# Patient Record
(demographics unavailable — no encounter records)

---

## 2025-01-15 NOTE — ASSESSMENT
[FreeTextEntry1] : 1. average risk for colon cancer recommend colonoscopy for screening  Discussed risks including but not limited to bleeding,infection,drug reaction, perforation,missed lesion,benefits and alternatives of colonoscopy/egd with patient  including no treatment and patient consents to procedure.  plan colonoscopy to be scheduled  2. weight loss, frequent bms ,abdominal tenderness on exam recommend egd r/o pud, sprue etc.  plan egd to be scheduled.

## 2025-01-15 NOTE — REVIEW OF SYSTEMS
[As Noted in HPI] : as noted in HPI [Fever] : no fever [Chills] : no chills [Red Eyes] : eyes not red [Sore Throat] : no sore throat [Chest Pain] : no chest pain [SOB on Exertion] : no shortness of breath during exertion [Joint Stiffness] : no joint stiffness [Muscle Weakness] : no muscle weakness [Easy Bruising] : no tendency for easy bruising

## 2025-01-15 NOTE — HISTORY OF PRESENT ILLNESS
[FreeTextEntry1] : 66 yo here for weight loss.Patient reports 20lb weight loss over few months.Was seen in [facility] last month told had urine infection. Changed diet and lost weight l Patient reports 2bms, no brbpr, no melena.. no n/v. no gerd. no abdominal pain    last colonoscopy 2010 revealed diverticulosis and sigmoid polyps.  ct abdomen/pelvis for weightloss  unrevealing 2023 ct  chest 2024 unrevealing

## 2025-01-15 NOTE — PHYSICAL EXAM
[Alert] : alert [Healthy Appearing] : healthy appearing [Sclera] : the sclera and conjunctiva were normal [Normal Appearance] : the appearance of the neck was normal [No Respiratory Distress] : no respiratory distress [Auscultation Breath Sounds / Voice Sounds] : lungs were clear to auscultation bilaterally [Heart Rate And Rhythm] : heart rate was normal and rhythm regular [Normal S1, S2] : normal S1 and S2 [Bowel Sounds] : normal bowel sounds [Abdomen Soft] : soft [No CVA Tenderness] : no CVA  tenderness [Abnormal Walk] : normal gait [No Clubbing, Cyanosis] : no clubbing or cyanosis of the fingernails [] : no rash [No Focal Deficits] : no focal deficits [Oriented To Time, Place, And Person] : oriented to person, place, and time [de-identified] : mild tenderness epigastric area

## 2025-02-25 NOTE — HISTORY OF PRESENT ILLNESS
[de-identified] : Joana Is a 67-year-old female here for initial consultation regarding recent CT finding of tiny fat-containing umbilical hernia.

## 2025-05-27 NOTE — REVIEW OF SYSTEMS
[As Noted in HPI] : as noted in HPI [Feeling Poorly] : not feeling poorly [Red Eyes] : eyes not red [Sore Throat] : no sore throat [SOB on Exertion] : no shortness of breath during exertion [Rash] : no rash [Joint Stiffness] : no joint stiffness [Skin Wound] : no skin wound [Dizziness] : no dizziness [Anxiety] : no anxiety [Muscle Weakness] : no muscle weakness [Easy Bruising] : no tendency for easy bruising

## 2025-05-27 NOTE — HISTORY OF PRESENT ILLNESS
[FreeTextEntry1] : 68 yo female  with c/o nausea, some vomiting, diffuse abdominal discomfort especially at  night, improved with bm. weight stable, GERD at times.  s/p ct scan 01/2025, diverticulosis, stool, thickening of endometrium , saw GYN s/p egd revealed small hiatal  gastric polyps, mild gastritis, biopsies unrevealing s/p colonoscopy  03/2025 internal hemorrhoids

## 2025-05-27 NOTE — PHYSICAL EXAM
[Alert] : alert [Healthy Appearing] : healthy appearing [Sclera] : the sclera and conjunctiva were normal [Normal Appearance] : the appearance of the neck was normal [No Respiratory Distress] : no respiratory distress [Auscultation Breath Sounds / Voice Sounds] : lungs were clear to auscultation bilaterally [Heart Rate And Rhythm] : heart rate was normal and rhythm regular [Normal S1, S2] : normal S1 and S2 [None] : no edema [Bowel Sounds] : normal bowel sounds [Abdomen Tenderness] : non-tender [Abdomen Soft] : soft [No CVA Tenderness] : no CVA  tenderness [Abnormal Walk] : normal gait [No Clubbing, Cyanosis] : no clubbing or cyanosis of the fingernails [] : no rash [No Focal Deficits] : no focal deficits [Oriented To Time, Place, And Person] : oriented to person, place, and time

## 2025-05-27 NOTE — ASSESSMENT
[FreeTextEntry1] : 1. vague abdominal pain probable related to constipation, IBS  plan miralax daily f/u gi in  6 weeks  2. Nausea, occassional vomiting, probable GERd  plan ppi daily

## 2025-06-10 NOTE — CONSULT LETTER
[Dear  ___] : Dear  [unfilled], [Consult Letter:] : I had the pleasure of evaluating your patient, [unfilled]. [Please see my note below.] : Please see my note below. [Consult Closing:] : Thank you very much for allowing me to participate in the care of this patient.  If you have any questions, please do not hesitate to contact me. [Sincerely,] : Sincerely, [FreeTextEntry3] : Hunter Hinojosa M.D., F.A.C.S, F.A.S.C.R.S [DrKareen  ___] : Dr. CASEY

## 2025-06-10 NOTE — HISTORY OF PRESENT ILLNESS
[de-identified] : Joana is a 68 y/o female here for a consultation visit, hernia.   CT A+P on 1/17/25 - 1. Tiny fat-containing umbilical hernia without inflammatory changes. No acute abdominal pelvic findings. Normal appendix. 2. Abnormally thickened endometrium measuring 1.8 cm in AP thickness, which could be due to malignancy, a large polyp, or hyperplasia. Recommend correlation for vaginal bleeding and nonemergent pelvic ultrasound for further evaluation.  Today pt reports no pain. Daily BMs. Sometimes has nausea. Low appetite. Not taking any anticoagulants. Does see a bulge above umbilicus for a few years, denies increase in size of bulge, and denies discomfort from area.

## 2025-06-10 NOTE — HISTORY OF PRESENT ILLNESS
[de-identified] : Joana is a 68 y/o female here for a consultation visit, hernia.   CT A+P on 1/17/25 - 1. Tiny fat-containing umbilical hernia without inflammatory changes. No acute abdominal pelvic findings. Normal appendix. 2. Abnormally thickened endometrium measuring 1.8 cm in AP thickness, which could be due to malignancy, a large polyp, or hyperplasia. Recommend correlation for vaginal bleeding and nonemergent pelvic ultrasound for further evaluation.  Today pt reports no pain. Daily BMs. Sometimes has nausea. Low appetite. Not taking any anticoagulants. Does see a bulge above umbilicus for a few years, denies increase in size of bulge, and denies discomfort from area.

## 2025-06-10 NOTE — PHYSICAL EXAM
[Normal Breath Sounds] : Normal breath sounds [Normal Heart Sounds] : normal heart sounds [Alert] : alert [Oriented to Person] : oriented to person [Oriented to Place] : oriented to place [Oriented to Time] : oriented to time [Calm] : calm [Abdominal Masses] : No abdominal masses [No HSM] : no hepatosplenomegaly [de-identified] : WNL [de-identified] : WNL [de-identified] : LOBITOL [de-identified] : Soft, nontender, no palpable umbilical hernia.  No inguinal or femoral hernias bilaterally. [de-identified] : WNL ROM [de-identified] : WNL

## 2025-06-10 NOTE — CONSULT LETTER
[Dear  ___] : Dear  [unfilled], [Consult Letter:] : I had the pleasure of evaluating your patient, [unfilled]. [Please see my note below.] : Please see my note below. [Consult Closing:] : Thank you very much for allowing me to participate in the care of this patient.  If you have any questions, please do not hesitate to contact me. [FreeTextEntry3] : Hunter Hinojosa M.D., F.A.C.S, F.A.S.C.R.S [Sincerely,] : Sincerely, [DrKareen  ___] : Dr. CASEY

## 2025-06-10 NOTE — PHYSICAL EXAM
[Normal Breath Sounds] : Normal breath sounds [Normal Heart Sounds] : normal heart sounds [Alert] : alert [Oriented to Person] : oriented to person [Oriented to Place] : oriented to place [Oriented to Time] : oriented to time [Calm] : calm [Abdominal Masses] : No abdominal masses [No HSM] : no hepatosplenomegaly [de-identified] : WNL [de-identified] : WNL [de-identified] : LOBITOL [de-identified] : Soft, nontender, no palpable umbilical hernia.  No inguinal or femoral hernias bilaterally. [de-identified] : WNL ROM [de-identified] : WNL

## 2025-06-10 NOTE — ASSESSMENT
[FreeTextEntry1] : In summary the patient has a tiny fat-containing nonpalpable umbilical hernia incidentally seen on CT.  I reassured her that this is not clinically significant and there is no indication for surgery.

## 2025-07-07 NOTE — PHYSICAL EXAM
[Alert] : alert [Healthy Appearing] : healthy appearing [Sclera] : the sclera and conjunctiva were normal [Hearing Threshold Finger Rub Not [place]] : hearing was normal [Normal Appearance] : the appearance of the neck was normal [No Respiratory Distress] : no respiratory distress [Auscultation Breath Sounds / Voice Sounds] : lungs were clear to auscultation bilaterally [Heart Rate And Rhythm] : heart rate was normal and rhythm regular [Normal S1, S2] : normal S1 and S2 [None] : no edema [Bowel Sounds] : normal bowel sounds [Abdomen Tenderness] : non-tender [Abdomen Soft] : soft [No CVA Tenderness] : no CVA  tenderness [Abnormal Walk] : normal gait [No Clubbing, Cyanosis] : no clubbing or cyanosis of the fingernails [] : no rash [No Focal Deficits] : no focal deficits [Oriented To Time, Place, And Person] : oriented to person, place, and time

## 2025-07-07 NOTE — HISTORY OF PRESENT ILLNESS
[FreeTextEntry1] : 68 yo female with chronic constipation , has bm every other day. with miralax no brbpr, no melena.  Continues to lose weight. . Poor appetite, eats 2 meals a day. no n/v. no gerrd .Takes ppii daily.  Patient reports sees ? parasites in her stool no recent travel. Patient continues to c/o vague diffuse abdominal pain.  /s/p ct scan 01/2025, diverticulosis, stool, thickening of endometrium , saw GYN s/p egd revealed small hiatal gastric polyps, mild gastritis, biopsies unrevealing s/p colonoscopy 03/2025 internal hemorrhoids

## 2025-07-07 NOTE — REVIEW OF SYSTEMS
[As Noted in HPI] : as noted in HPI [Fever] : no fever [Chills] : no chills [Red Eyes] : eyes not red [Sore Throat] : no sore throat [Chest Pain] : no chest pain [SOB on Exertion] : no shortness of breath during exertion [Joint Stiffness] : no joint stiffness [Skin Wound] : no skin wound [Fainting] : no fainting [Depression] : no depression [Easy Bruising] : no tendency for easy bruising